# Patient Record
Sex: FEMALE | Race: WHITE | NOT HISPANIC OR LATINO | Employment: UNEMPLOYED | URBAN - METROPOLITAN AREA
[De-identification: names, ages, dates, MRNs, and addresses within clinical notes are randomized per-mention and may not be internally consistent; named-entity substitution may affect disease eponyms.]

---

## 2019-12-22 PROBLEM — K21.00 GASTROESOPHAGEAL REFLUX DISEASE WITH ESOPHAGITIS: Status: ACTIVE | Noted: 2019-12-22

## 2019-12-22 PROBLEM — C20 RECTAL CANCER: Status: ACTIVE | Noted: 2017-11-14

## 2019-12-22 PROBLEM — K44.9 HIATAL HERNIA: Status: ACTIVE | Noted: 2019-12-22

## 2019-12-22 PROBLEM — J44.9 CHRONIC OBSTRUCTIVE PULMONARY DISEASE: Status: ACTIVE | Noted: 2019-12-22

## 2019-12-22 PROBLEM — E66.3 OVERWEIGHT WITH BODY MASS INDEX (BMI) 25.0-29.9: Status: ACTIVE | Noted: 2019-12-22

## 2019-12-22 PROBLEM — I10 ESSENTIAL HYPERTENSION WITH GOAL BLOOD PRESSURE LESS THAN 130/85: Status: ACTIVE | Noted: 2019-12-22

## 2019-12-22 PROBLEM — K57.31 DIVERTICULOSIS OF LARGE INTESTINE WITH HEMORRHAGE: Status: ACTIVE | Noted: 2019-12-22

## 2019-12-22 PROBLEM — E03.9 HYPOTHYROIDISM: Status: ACTIVE | Noted: 2019-12-22

## 2019-12-22 PROBLEM — J41.0 SIMPLE CHRONIC BRONCHITIS: Status: ACTIVE | Noted: 2019-12-22

## 2019-12-22 PROBLEM — E78.5 DYSLIPIDEMIA: Status: ACTIVE | Noted: 2019-12-22

## 2019-12-22 PROBLEM — D50.9 IRON DEFICIENCY ANEMIA: Status: ACTIVE | Noted: 2019-12-22

## 2020-11-16 PROBLEM — M15.9 PRIMARY OSTEOARTHRITIS INVOLVING MULTIPLE JOINTS: Status: ACTIVE | Noted: 2020-11-16

## 2020-11-16 PROBLEM — M15.0 PRIMARY OSTEOARTHRITIS INVOLVING MULTIPLE JOINTS: Status: ACTIVE | Noted: 2020-11-16

## 2021-10-18 ENCOUNTER — PATIENT OUTREACH (OUTPATIENT)
Dept: ADMINISTRATIVE | Facility: HOSPITAL | Age: 71
End: 2021-10-18

## 2021-11-01 PROBLEM — C18.9 PRIMARY MALIGNANT NEOPLASM OF COLON: Status: ACTIVE | Noted: 2021-11-01

## 2021-11-01 PROBLEM — M84.376A STRESS FRACTURE OF CALCANEUS: Status: ACTIVE | Noted: 2021-08-12

## 2023-12-07 LAB — CRC RECOMMENDATION EXT: NORMAL

## 2023-12-14 ENCOUNTER — PATIENT OUTREACH (OUTPATIENT)
Dept: ADMINISTRATIVE | Facility: HOSPITAL | Age: 73
End: 2023-12-14

## 2024-11-25 PROBLEM — R60.0 PEDAL EDEMA: Status: ACTIVE | Noted: 2024-11-25

## 2024-11-29 ENCOUNTER — PATIENT OUTREACH (OUTPATIENT)
Dept: ADMINISTRATIVE | Facility: CLINIC | Age: 74
End: 2024-11-29

## 2024-11-29 NOTE — PROGRESS NOTES
C3 nurse spoke with Seema Santiago for a TCC post hospital discharge follow up call. The patient does not have a scheduled HOSFU appointment with Genaro Martins MD  within 5-7 days post hospital discharge date 11/26/24. C3 nurse was unable to schedule HOSFU appointment in [facility].    Message sent to PCP staff requesting they contact patient and schedule follow up appointment.